# Patient Record
Sex: MALE | Race: AMERICAN INDIAN OR ALASKA NATIVE
[De-identification: names, ages, dates, MRNs, and addresses within clinical notes are randomized per-mention and may not be internally consistent; named-entity substitution may affect disease eponyms.]

---

## 2022-09-09 ENCOUNTER — HOSPITAL ENCOUNTER (EMERGENCY)
Dept: HOSPITAL 5 - ED | Age: 52
Discharge: HOME | End: 2022-09-09
Payer: MEDICARE

## 2022-09-09 VITALS — SYSTOLIC BLOOD PRESSURE: 136 MMHG | DIASTOLIC BLOOD PRESSURE: 96 MMHG

## 2022-09-09 DIAGNOSIS — Z91.09: ICD-10-CM

## 2022-09-09 DIAGNOSIS — Z86.73: ICD-10-CM

## 2022-09-09 DIAGNOSIS — N18.6: ICD-10-CM

## 2022-09-09 DIAGNOSIS — I12.0: ICD-10-CM

## 2022-09-09 DIAGNOSIS — R07.9: Primary | ICD-10-CM

## 2022-09-09 LAB
ALBUMIN SERPL-MCNC: (no result) G/DL (ref 3.9–5)
ALBUMIN SERPL-MCNC: 4.5 G/DL (ref 3.9–5)
ALT SERPL-CCNC: (no result) UNITS/L (ref 7–56)
ALT SERPL-CCNC: 36 UNITS/L (ref 7–56)
BAND NEUTROPHILS # (MANUAL): 0 K/MM3
BUN SERPL-MCNC: (no result) MG/DL (ref 9–20)
BUN SERPL-MCNC: 46 MG/DL (ref 9–20)
BUN/CREAT SERPL: (no result) %
BUN/CREAT SERPL: 4 %
CALCIUM SERPL-MCNC: (no result) MG/DL (ref 8.4–10.2)
CALCIUM SERPL-MCNC: 9.5 MG/DL (ref 8.4–10.2)
HCT VFR BLD CALC: 43 % (ref 35.5–45.6)
HDLC SERPL-MCNC: 39 MG/DL (ref 40–59)
HEMOLYSIS INDEX: 12
HEMOLYSIS INDEX: 990
HGB BLD-MCNC: 14.2 GM/DL (ref 11.8–15.2)
INR PPP: 0.97 (ref 0.87–1.13)
MCHC RBC AUTO-ENTMCNC: 33 % (ref 32–34)
MCV RBC AUTO: 96 FL (ref 84–94)
MYELOCYTES # (MANUAL): 0 K/MM3
PLATELET # BLD: 137 K/MM3 (ref 140–440)
PROMYELOCYTES # (MANUAL): 0 K/MM3
RBC # BLD AUTO: 4.49 M/MM3 (ref 3.65–5.03)
TOTAL CELLS COUNTED BLD: 100

## 2022-09-09 PROCEDURE — 85025 COMPLETE CBC W/AUTO DIFF WBC: CPT

## 2022-09-09 PROCEDURE — 84484 ASSAY OF TROPONIN QUANT: CPT

## 2022-09-09 PROCEDURE — 80061 LIPID PANEL: CPT

## 2022-09-09 PROCEDURE — 99284 EMERGENCY DEPT VISIT MOD MDM: CPT

## 2022-09-09 PROCEDURE — 85610 PROTHROMBIN TIME: CPT

## 2022-09-09 PROCEDURE — 36415 COLL VENOUS BLD VENIPUNCTURE: CPT

## 2022-09-09 PROCEDURE — 85007 BL SMEAR W/DIFF WBC COUNT: CPT

## 2022-09-09 PROCEDURE — 93005 ELECTROCARDIOGRAM TRACING: CPT

## 2022-09-09 PROCEDURE — 71045 X-RAY EXAM CHEST 1 VIEW: CPT

## 2022-09-09 PROCEDURE — 80053 COMPREHEN METABOLIC PANEL: CPT

## 2022-09-09 NOTE — XRAY REPORT
CHEST 1 VIEW 



INDICATION / CLINICAL INFORMATION: Chest Pain



STUDY TIME: 0516



COMPARISON: 3/7/2022



FINDINGS:



SUPPORT DEVICES: None



HEART / MEDIASTINUM: Mild cardiomegaly 



LUNGS / PLEURA: Mildly congested appearance is noted. Bibasilar atelectatic changes are seen. Possibl
e developing patchy infiltrate is seen in the left base though this could relate to the congestive pr
ocess. No definite pleural effusions. No pneumothorax. 



ADDITIONAL FINDINGS: No significant additional findings.





Signer Name: Alverto Sethi MD 

Signed: 9/9/2022 5:33 AM

Workstation Name: [id number]

## 2022-09-09 NOTE — EMERGENCY DEPARTMENT REPORT
ED Chest Pain HPI





- General


Chief Complaint: Chest Pain


Stated Complaint: CHEST PAIN


Time Seen by Provider: 09/09/22 07:15


Source: EMS


Mode of arrival: Stretcher


Limitations: No Limitations





- History of Present Illness


Initial Comments: 





Patient is a 51-year-old male presenting to ED with complaint of sharp chest 

pain radiating to his back that began at approximately 230 this morning.  States

his symptoms are now resolved.  He he has history of chronic kidney disease and 

is on dialysis Tuesday Thursday and Saturday.  Last dialysis session was yesterd

ay.





- Related Data


                                Home Medications











 Medication  Instructions  Recorded  Confirmed  Last Taken


 


Albuterol Sulfate [Proventil Hfa] 2 puff PO Q6H PRN 03/07/22 03/07/22 Unknown


 


AtorvaSTATin [Lipitor] 40 mg PO QHS 03/07/22 03/07/22 Unknown


 


Hydralazine HCl 100 mg PO Q8H 03/07/22 03/07/22 Unknown


 


Labetalol HCl [Labetalol 300mg TAB] 600 mg PO Q8H 03/07/22 03/07/22 Unknown


 


NIFEdipine [Nifedipine ER] 90 mg PO QDAY 03/07/22 03/07/22 Unknown


 


Sevelamer Carbonate [Renvela] 1,600 mg PO TIDWM 03/07/22 03/07/22 Unknown


 


Torsemide [Demadex] 100 mg PO QDAY 03/07/22 03/07/22 Unknown


 


amLODIPine 10 mg PO DAILY 03/07/22 03/07/22 Unknown


 


hydrOXYzine PAMOATE [Vistaril] 25 mg PO Q6HR 03/07/22 03/07/22 Unknown


 


levETIRAcetam [Keppra TAB] 250 mg PO 3XW 03/07/22 03/07/22 Unknown


 


levETIRAcetam [Keppra TAB] 500 mg PO Q12H 03/07/22 03/07/22 Unknown











                                    Allergies











Allergy/AdvReac Type Severity Reaction Status Date / Time


 


promethazine [From Phenergan] Allergy  Nausea Verified 03/07/22 04:43














Heart Score





- HEART Score


History: Slightly suspicious


EKG: Non-specific


Age: 45-65


Risk factors: 1-2 risk factors


Troponin: > 3x normal limit


HEART Score: 5





- EKG Read Time


Time EKG Completed: 03:41


EKG Read Time: 03:45





- Critical Actions


Critical Actions: 4-6 pts:12-16.6% risk of adverse cardiac event. Should be 

admitted





ED Review of Systems


ROS: 


Stated complaint: CHEST PAIN


Other details as noted in HPI





Constitutional: denies: chills, fever


Respiratory: denies: cough, shortness of breath, wheezing


Cardiovascular: chest pain.  denies: palpitations


Gastrointestinal: denies: abdominal pain, nausea, diarrhea


Musculoskeletal: denies: back pain, joint swelling, arthralgia


Skin: denies: rash, lesions


Neurological: denies: headache, weakness, paresthesias


Psychiatric: denies: anxiety, depression





ED Past Medical Hx





- Past Medical History


Previous Medical History?: Yes


Hx Hypertension: Yes


Hx CVA: Yes (Hemorrhagic CVA)


Hx Congestive Heart Failure: No


Hx Diabetes: No


Hx Kidney Stones: Yes (ESRD)


Hx Asthma: No


Hx COPD: No


Additional medical history: AAA, BRAIN BLEED





- Surgical History


Past Surgical History?: Yes


Additional Surgical History: Left upper extremity fistula





- Social History


Smoking Status: Unknown if ever smoked





- Medications


Home Medications: 


                                Home Medications











 Medication  Instructions  Recorded  Confirmed  Last Taken  Type


 


Albuterol Sulfate [Proventil Hfa] 2 puff PO Q6H PRN 03/07/22 03/07/22 Unknown 

History


 


AtorvaSTATin [Lipitor] 40 mg PO QHS 03/07/22 03/07/22 Unknown History


 


Hydralazine HCl 100 mg PO Q8H 03/07/22 03/07/22 Unknown History


 


Labetalol HCl [Labetalol 300mg TAB] 600 mg PO Q8H 03/07/22 03/07/22 Unknown 

History


 


NIFEdipine [Nifedipine ER] 90 mg PO QDAY 03/07/22 03/07/22 Unknown History


 


Sevelamer Carbonate [Renvela] 1,600 mg PO TIDWM 03/07/22 03/07/22 Unknown 

History


 


Torsemide [Demadex] 100 mg PO QDAY 03/07/22 03/07/22 Unknown History


 


amLODIPine 10 mg PO DAILY 03/07/22 03/07/22 Unknown History


 


hydrOXYzine PAMOATE [Vistaril] 25 mg PO Q6HR 03/07/22 03/07/22 Unknown History


 


levETIRAcetam [Keppra TAB] 250 mg PO 3XW 03/07/22 03/07/22 Unknown History


 


levETIRAcetam [Keppra TAB] 500 mg PO Q12H 03/07/22 03/07/22 Unknown History














ED Physical Exam





- General


Limitations: No Limitations


General appearance: alert, in no apparent distress





- Head


Head exam: Present: atraumatic, normocephalic





- Neck


Neck exam: Present: normal inspection





- Respiratory


Respiratory exam: Present: normal lung sounds bilaterally.  Absent: respiratory 

distress





- Cardiovascular


Cardiovascular Exam: Present: regular rate, normal rhythm, normal heart sounds





- GI/Abdominal


GI/Abdominal exam: Present: soft.  Absent: distended, tenderness





- Rectal


Rectal exam: Present: deferred





- Extremities Exam


Extremities exam: Present: other (Fistula to left arm with palpable thrill)





- Neurological Exam


Neurological exam: Present: alert, oriented X3





- Psychiatric


Psychiatric exam: Present: normal affect, normal mood





- Skin


Skin exam: Present: warm, dry, intact, normal color





ED Course


                                   Vital Signs











  09/09/22 09/09/22





  03:29 08:30


 


Temperature 98.2 F 


 


Pulse Rate 78 69


 


Respiratory 18 18





Rate  


 


Blood Pressure 180/90 


 


Blood Pressure  128/83





[Right]  


 


O2 Sat by Pulse 100 94





Oximetry  














ED Medical Decision Making





- Lab Data


Result diagrams: 


                                 09/09/22 05:53





                                 09/09/22 08:45





- EKG Data


-: EKG Interpreted by Me


EKG shows normal: sinus rhythm


Rate: normal





- EKG Data





09/09/22 11:22


Right bundle branch block and left anterior fascicular block





- Medical Decision Making





Labs grossly unremarkable except for chronically elevated 

creatinine/BUN/troponin.  Potassium within normal limits.  Patient currently is 

asymptomatic.  He is stable for discharge home with return precautions.


Critical care attestation.: 


If time is entered above; I have spent that time in minutes in the direct care 

of this critically ill patient, excluding procedure time.








ED Disposition


Clinical Impression: 


 Nonspecific chest pain





Disposition: 01 HOME / SELF CARE / HOMELESS


Is pt being admited?: No


Condition: Stable


Instructions:  Nonspecific Chest Pain, Adult


Referrals: 


YVONNE PARSONS MD [Primary Care Provider] - 3-5 Days


Time of Disposition: 11:22

## 2022-09-09 NOTE — ELECTROCARDIOGRAPH REPORT
[facility]

                                       

Test Date:    2022               Test Time:    03:41:16

Pat Name:     LETICIA TERRELL              Department:   

Patient ID:   [id number]          Room:          

Gender:       M                        Technician:   MAKI

:          1970               Requested By: FREYA JOYA

Order Number: [id number]             Reading MD:   Rico Keen

                                 Measurements

Intervals                              Axis          

Rate:         68                       P:            0

ID:           [id number]                       QRS:          -48

QRSD:         165                      T:            16

QT:           449                                    

QTc:          478                                    

                           Interpretive Statements

Sinus rhythm

RBBB and LAFB

Compared to ECG 2022 06:13:56

Left anterior fascicular block now present

Electronically Signed On 2022 10:57:57 EDT by Rico Keen